# Patient Record
Sex: FEMALE | Race: WHITE | Employment: FULL TIME | URBAN - METROPOLITAN AREA
[De-identification: names, ages, dates, MRNs, and addresses within clinical notes are randomized per-mention and may not be internally consistent; named-entity substitution may affect disease eponyms.]

---

## 2020-10-01 ENCOUNTER — TELEPHONE (OUTPATIENT)
Dept: ORTHOPEDIC SURGERY | Age: 45
End: 2020-10-01

## 2020-10-12 ENCOUNTER — TELEPHONE (OUTPATIENT)
Dept: ORTHOPEDIC SURGERY | Age: 45
End: 2020-10-12

## 2020-10-12 NOTE — TELEPHONE ENCOUNTER
DR. Berny Grover PRE SURGERY CALL & DISCUSSION     - Visited PCP for H&P within 30 days of surgery? YES completed 10/8    -Pre-op lab work completed or scheduled? EKG scheduled with Cayedn Carranza Test completed (6 days prior to surgery? Aware    -Pre-op PT completed or scheduled? N/A    -Post-op PT appointments scheduled? N/A    -Confirmed DME (Maebelle Cramp)? N/A     -Joint replacement class? Strongly recommended! N/A     -4 week post-op appointment scheduled? 1 week scheduled 10/30    -Post op medications:    - Aspirin 80 mg BID x 30 days (unless prescription blood thinner is otherwise indicated)    - Duricef 500mg BID x 3 days (antibiotic)    - Oxycodone IR or Percocet 5mg (1-2 tablets PO Q4-6 hours PRN pain)    -Valium 5mg (1 tablet PO Q6 hours prn spasms)    -Zofran PRN nausea    -Patient voiced understanding?  AWARE      -Additional comments/discussion:

## 2020-10-20 ENCOUNTER — TELEPHONE (OUTPATIENT)
Dept: ORTHOPEDIC SURGERY | Age: 45
End: 2020-10-20

## 2020-10-22 RX ORDER — OXYCODONE HYDROCHLORIDE AND ACETAMINOPHEN 5; 325 MG/1; MG/1
1 TABLET ORAL EVERY 4 HOURS PRN
Qty: 12 TABLET | Refills: 0 | Status: SHIPPED | OUTPATIENT
Start: 2020-10-22 | End: 2020-11-11

## 2020-10-22 NOTE — TELEPHONE ENCOUNTER
PT called after hours number with increased pain overnight. Per Dr. Ivania Shi OK to prescribe Percocet q4 #20.      S/P RT knee scope 10/20/2020

## 2020-10-30 ENCOUNTER — OFFICE VISIT (OUTPATIENT)
Dept: ORTHOPEDIC SURGERY | Age: 45
End: 2020-10-30

## 2020-10-30 VITALS — HEIGHT: 64 IN

## 2020-10-30 PROCEDURE — 99024 POSTOP FOLLOW-UP VISIT: CPT | Performed by: ORTHOPAEDIC SURGERY

## 2020-10-30 RX ORDER — ATORVASTATIN CALCIUM 20 MG/1
TABLET, FILM COATED ORAL
COMMUNITY
Start: 2020-09-10

## 2020-10-30 RX ORDER — HYDROCODONE BITARTRATE AND ACETAMINOPHEN 5; 325 MG/1; MG/1
TABLET ORAL
COMMUNITY
Start: 2020-10-19

## 2020-10-30 RX ORDER — BUPROPION HYDROCHLORIDE 150 MG/1
TABLET ORAL
COMMUNITY
Start: 2020-10-04

## 2020-10-30 RX ORDER — PREDNISONE 10 MG/1
TABLET ORAL
Qty: 23 TABLET | Refills: 0 | Status: SHIPPED | OUTPATIENT
Start: 2020-10-30

## 2020-10-30 RX ORDER — SUMATRIPTAN 100 MG/1
TABLET, FILM COATED ORAL
COMMUNITY
Start: 2020-09-10

## 2020-10-30 RX ORDER — BLOOD-GLUCOSE METER
KIT MISCELLANEOUS
COMMUNITY
Start: 2020-09-10

## 2020-10-30 RX ORDER — METFORMIN HYDROCHLORIDE 500 MG/1
TABLET, EXTENDED RELEASE ORAL
COMMUNITY
Start: 2020-09-10

## 2020-10-30 RX ORDER — SERTRALINE HYDROCHLORIDE 100 MG/1
TABLET, FILM COATED ORAL
COMMUNITY
Start: 2020-10-04

## 2020-10-30 RX ORDER — PROPRANOLOL HCL 60 MG
CAPSULE, EXTENDED RELEASE 24HR ORAL
COMMUNITY
Start: 2020-09-10

## 2020-10-30 NOTE — PROGRESS NOTES
Patient is a week out from her right knee arthroscopy. 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 She is doing okay. Still does have some mild swelling. Been off her ambulation aids for several days. Has some soreness and stiffness but really not too much in the way of pain. Date of Encounter: 10/30/2020  Patient Aki Alvarez    Chief Complaint   Patient presents with    Post-Op Check     RT knee        History of Present Illness:  Patient is a week out from her right knee arthroscopy. 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 She is doing okay. Still does have some mild swelling. Been off her ambulation aids for several days. Has some soreness and stiffness but really not too much in the way of pain. History reviewed. No pertinent past medical history. History reviewed. No pertinent surgical history. Current Outpatient Medications   Medication Sig Dispense Refill    predniSONE (DELTASONE) 10 MG tablet Take tabs po:   Day 1: 6 tabs  Day 2&3: 4 tabs  Day 4&5: 3 tabs  Day 6: 2 tabs   Day 7: 1 tab 23 tablet 0    atorvastatin (LIPITOR) 20 MG tablet       buPROPion (WELLBUTRIN XL) 150 MG extended release tablet       FREESTYLE LITE strip       HYDROcodone-acetaminophen (NORCO) 5-325 MG per tablet       metFORMIN (GLUCOPHAGE-XR) 500 MG extended release tablet       propranolol (INDERAL LA) 60 MG extended release capsule       sertraline (ZOLOFT) 100 MG tablet       SUMAtriptan (IMITREX) 100 MG tablet       oxyCODONE-acetaminophen (PERCOCET) 5-325 MG per tablet Take 1 tablet by mouth every 4 hours as needed for Pain for up to 20 days. Intended supply: 4 days. Take lowest dose possible to manage pain 12 tablet 0     No current facility-administered medications for this visit.        allergies, social and family histories were reviewed and updated as appropriate. Review of Systems:  Relevant review of systems reviewed and available in the patient's chart and scanned in under the MEDIA tab on 10/30/2020. Vital Signs:  Ht 5' 4\" (1.626 m)     General Exam:   Constitutional: She is adequately groomed with no evidence of malnutrition, obesity absent  Mental Status: She is oriented to time, place and person. Normal mentation and affect for age. Lymphatic: The lymphatic examination bilaterally reveals all areas to be without enlargement or induration. Vascular: Examination reveals no swelling or calf tenderness. Peripheral pulses are palpable and 2+. Neurological: She has good coordination and balance. There is no focal weakness or sensory deficit. Pertinent Exam:  Right knee does have mild effusion. Has well-healed portals. Has full active extension. Does have some limited flexion. Xray Findings:  No x-rays were obtained    Assessment & Plan:  Patient doing fairly well from her arthroscopy. Does not feel therapy is necessary she will continue doing it on her own. We will call her in a 1 week prednisone taper to help with the swelling. I will see her back in 4 weeks. Do feel she would be a good candidate for viscosupplementation down the road if her symptoms persist.      Documentation was done using voice recognition dragon software. Every effort was made to ensure accuracy; however, inadvertent  Unintentional computerized transcription errors may be present.